# Patient Record
Sex: FEMALE | Race: WHITE
[De-identification: names, ages, dates, MRNs, and addresses within clinical notes are randomized per-mention and may not be internally consistent; named-entity substitution may affect disease eponyms.]

---

## 2018-01-24 ENCOUNTER — HOSPITAL ENCOUNTER (OUTPATIENT)
Dept: HOSPITAL 24 - RAD | Age: 48
End: 2018-01-24
Attending: FAMILY MEDICINE
Payer: SELF-PAY

## 2018-01-24 DIAGNOSIS — Z13.6: Primary | ICD-10-CM

## 2018-01-24 NOTE — CT
HISTORY:  Left-sided chest pain and dyspnea



Cardiac calcium scoring.



Technique: Multiple axial images of the chest were obtained on a 320 slice multidetector CT from the 
aortic arch to the base of the heart with noncontrast prospective gating.AEC was utilized.



Findings:



A total calcium score of 0 is observed.  The score results in very low, less than 5%, likelihood of c
oronary events given the age and sex matched cohort analysis.  



There is left basilar scar versus subsegmental atelectasis.  Surrounding soft tissues and osseous str
uctures are otherwise unremarkable.



IMPRESSION: 



Coronary calcium score of 0.







Reported By:Electronically Signed by AZALIA PEACOCK MD at 1/24/2018 1:47:05 PM